# Patient Record
Sex: MALE | Race: ASIAN
[De-identification: names, ages, dates, MRNs, and addresses within clinical notes are randomized per-mention and may not be internally consistent; named-entity substitution may affect disease eponyms.]

---

## 2023-02-01 ENCOUNTER — DASHBOARD ENCOUNTERS (OUTPATIENT)
Age: 73
End: 2023-02-01

## 2023-02-01 ENCOUNTER — OFFICE VISIT (OUTPATIENT)
Dept: URBAN - METROPOLITAN AREA CLINIC 105 | Facility: CLINIC | Age: 73
End: 2023-02-01
Payer: COMMERCIAL

## 2023-02-01 ENCOUNTER — WEB ENCOUNTER (OUTPATIENT)
Dept: URBAN - METROPOLITAN AREA CLINIC 105 | Facility: CLINIC | Age: 73
End: 2023-02-01

## 2023-02-01 VITALS
WEIGHT: 119.6 LBS | SYSTOLIC BLOOD PRESSURE: 132 MMHG | HEIGHT: 64 IN | TEMPERATURE: 97.2 F | HEART RATE: 91 BPM | DIASTOLIC BLOOD PRESSURE: 88 MMHG | BODY MASS INDEX: 20.42 KG/M2

## 2023-02-01 DIAGNOSIS — K21.9 GERD WITHOUT ESOPHAGITIS: ICD-10-CM

## 2023-02-01 DIAGNOSIS — R13.10 DYSPHAGIA, UNSPECIFIED TYPE: ICD-10-CM

## 2023-02-01 DIAGNOSIS — A04.8 H. PYLORI INFECTION: ICD-10-CM

## 2023-02-01 DIAGNOSIS — D50.0 IRON DEFICIENCY ANEMIA SECONDARY TO BLOOD LOSS (CHRONIC): ICD-10-CM

## 2023-02-01 DIAGNOSIS — Z12.11 COLON CANCER SCREENING: ICD-10-CM

## 2023-02-01 PROBLEM — 724556004: Status: ACTIVE | Noted: 2023-02-01

## 2023-02-01 PROBLEM — 40739000: Status: ACTIVE | Noted: 2023-02-01

## 2023-02-01 PROBLEM — 266435005: Status: ACTIVE | Noted: 2023-02-01

## 2023-02-01 PROCEDURE — 99213 OFFICE O/P EST LOW 20 MIN: CPT | Performed by: INTERNAL MEDICINE

## 2023-02-01 RX ORDER — OMEPRAZOLE 40 MG/1
TAKE 1 CAPSULE BY MOUTH ONCE DAILY TAKE 30 TO 60 MINS PRIOR TO FIRST MEAL CAPSULE, DELAYED RELEASE PELLETS ORAL
Qty: 30 | Refills: 5 | Status: ON HOLD | COMMUNITY
Start: 2018-10-15

## 2023-02-01 NOTE — HPI-TODAY'S VISIT:
PAST MEDICAL HISTORY: The patient presents on follow-up for an H. pylori infection.  On 7/19/18, the patient stated he had iron deficiency anemia and took an iron supplement TID for the past 2 weeks. The patient stated that in the beginning of the year he had bone spurs. He would treat the pain with Advil/ibuprofen up to 800 mg BID for > 1 month. He had been taking Advil 1 pill TID for the past 2 weeks. A colonoscopy done on 8/27/18 was neg for a bleeding source. An EGD on 8/27/18 noted H. pylori gastritis.  On 9/7/18, the patient had a concern of not being able to train for a marathon because of his anemia and H. pylori infection. He had not resumed iron supplementation.   On 10/30/18, the patient noted he tolerated the abx for H. pylori eradication well. He had continued on omeprazole 40 mg QD and vit D supplement. He resumed taking an iron supplement daily after his last visit in spite of the instruction not to resume because his iron studies were normal 9/7/18. He denied any other GI symptoms.  On 1/30/19, he said he was off his iron supplement, vit D supplement, and omeprazole. He denied taking any ibuprofen/NSAID. He noted globus sensation in the lower neck when swallowing food - he believed this is from mucous/sinus discharge.   HPI: Today, the patient presents in clinic to discuss when/how frequent he should be getting an EGD and colonoscopy. I informed the patient that he is due for a colon in 2028 and there is not a need for an EGD. His only symptom is occasional heartburn, but is not significant (patient does not want acid suppression medication). He is no longer on antacid medication. He denies dysphagia. Labs from 3 months ago per pt were normal - he specifically denies anemia.   Labs 1/30/19 - CBC, iron/TIBC, ferritin, vit D all normal. 10/30/18 - H. pylori breath test negative. 9/7/18 - CBC normal, TIBC 410, iron 143, iron sat 35%, ferritin 37.  6/28/18 - Vit D 25, hgb 10.3, MCV 90.8, iron 24/low, CMP normal. TSH normal.